# Patient Record
Sex: FEMALE | Race: WHITE | NOT HISPANIC OR LATINO | URBAN - METROPOLITAN AREA
[De-identification: names, ages, dates, MRNs, and addresses within clinical notes are randomized per-mention and may not be internally consistent; named-entity substitution may affect disease eponyms.]

---

## 2017-07-07 ENCOUNTER — TELEPHONE (OUTPATIENT)
Dept: PODIATRY | Facility: CLINIC | Age: 22
End: 2017-07-07

## 2017-07-07 NOTE — TELEPHONE ENCOUNTER
----- Message from Almaz Murcia sent at 7/7/2017  9:43 AM CDT -----  Contact: [phone]  Patient mother called to set up an appointment for this morning.

## 2017-07-07 NOTE — TELEPHONE ENCOUNTER
----- Message from Radha Mcneal sent at 7/6/2017  4:20 PM CDT -----  Contact: PT  PT is looking to be seen by Dr. Steve if possible.  PT has a lump on the top of the big toe (underneath skin).    PT callback: [phone]

## 2017-07-07 NOTE — TELEPHONE ENCOUNTER
Spoke with Mom, she will not be able to come to [facility] today. Mom, Tish thought that Dr Steve was at the [place] location, explained that he is there on Tuesdays requested appointment on Tuesday. Appointment scheduled for 07/11/2017 at 9:15 am.